# Patient Record
Sex: MALE | Race: ASIAN | NOT HISPANIC OR LATINO | URBAN - METROPOLITAN AREA
[De-identification: names, ages, dates, MRNs, and addresses within clinical notes are randomized per-mention and may not be internally consistent; named-entity substitution may affect disease eponyms.]

---

## 2020-09-16 ENCOUNTER — NEW PATIENT (OUTPATIENT)
Dept: URBAN - METROPOLITAN AREA CLINIC 26 | Facility: CLINIC | Age: 70
End: 2020-09-16

## 2020-09-16 VITALS — BODY MASS INDEX: 22.96 KG/M2 | HEIGHT: 69 IN | WEIGHT: 155 LBS

## 2020-09-16 DIAGNOSIS — H35.3132: ICD-10-CM

## 2020-09-16 DIAGNOSIS — H35.341: ICD-10-CM

## 2020-09-16 DIAGNOSIS — H35.463: ICD-10-CM

## 2020-09-16 DIAGNOSIS — H43.813: ICD-10-CM

## 2020-09-16 PROCEDURE — 92250 FUNDUS PHOTOGRAPHY W/I&R: CPT

## 2020-09-16 PROCEDURE — 99204 OFFICE O/P NEW MOD 45 MIN: CPT

## 2020-09-16 ASSESSMENT — TONOMETRY
OS_IOP_MMHG: 15
OD_IOP_MMHG: 15

## 2020-09-16 ASSESSMENT — VISUAL ACUITY
OS_SC: 20/60
OD_SC: 20/200

## 2020-10-14 ENCOUNTER — PRE-OP VISIT (OUTPATIENT)
Dept: URBAN - METROPOLITAN AREA CLINIC 26 | Facility: CLINIC | Age: 70
End: 2020-10-14

## 2020-10-14 DIAGNOSIS — H43.813: ICD-10-CM

## 2020-10-14 DIAGNOSIS — H35.3132: ICD-10-CM

## 2020-10-14 DIAGNOSIS — H35.463: ICD-10-CM

## 2020-10-14 DIAGNOSIS — H35.341: ICD-10-CM

## 2020-10-14 PROCEDURE — 92250 FUNDUS PHOTOGRAPHY W/I&R: CPT

## 2020-10-14 PROCEDURE — 99214 OFFICE O/P EST MOD 30 MIN: CPT

## 2020-10-14 ASSESSMENT — TONOMETRY
OS_IOP_MMHG: 18
OD_IOP_MMHG: 15

## 2020-10-14 ASSESSMENT — VISUAL ACUITY
OS_SC: 20/50+2
OD_SC: 20/200

## 2020-10-21 ENCOUNTER — SURGERY/PROCEDURE (OUTPATIENT)
Dept: URBAN - METROPOLITAN AREA SURGERY 10 | Facility: SURGERY | Age: 70
End: 2020-10-21

## 2020-10-21 DIAGNOSIS — H35.341: ICD-10-CM

## 2020-10-21 PROCEDURE — 67042 VIT FOR MACULAR HOLE: CPT

## 2020-10-22 ENCOUNTER — POST OP-DILATION (OUTPATIENT)
Dept: URBAN - METROPOLITAN AREA CLINIC 26 | Facility: CLINIC | Age: 70
End: 2020-10-22

## 2020-10-22 DIAGNOSIS — H35.341: ICD-10-CM

## 2020-10-22 DIAGNOSIS — Z98.890: ICD-10-CM

## 2020-10-22 PROCEDURE — 99024 POSTOP FOLLOW-UP VISIT: CPT

## 2020-10-22 RX ORDER — TOBRAMYCIN 3 MG/ML: 1 SOLUTION/ DROPS OPHTHALMIC

## 2020-10-22 RX ORDER — BRIMONIDINE TARTRATE 2 MG/MG: 1 SOLUTION/ DROPS OPHTHALMIC

## 2020-10-22 RX ORDER — PREDNISOLONE ACETATE 10 MG/ML: 1 SUSPENSION/ DROPS OPHTHALMIC

## 2020-10-22 ASSESSMENT — VISUAL ACUITY
OD_SC: CF 1FT
OS_SC: 20/40

## 2020-10-22 ASSESSMENT — TONOMETRY
OS_IOP_MMHG: 19
OD_IOP_MMHG: 21

## 2020-10-23 ENCOUNTER — POST OP-DILATION (OUTPATIENT)
Dept: URBAN - METROPOLITAN AREA CLINIC 26 | Facility: CLINIC | Age: 70
End: 2020-10-23

## 2020-10-23 DIAGNOSIS — Z98.890: ICD-10-CM

## 2020-10-23 DIAGNOSIS — H35.341: ICD-10-CM

## 2020-10-23 PROCEDURE — 99024 POSTOP FOLLOW-UP VISIT: CPT

## 2020-10-23 ASSESSMENT — VISUAL ACUITY
OD_SC: CF 1FT
OS_SC: 20/40-1

## 2020-10-23 ASSESSMENT — TONOMETRY
OS_IOP_MMHG: 14
OD_IOP_MMHG: 13

## 2020-10-28 ENCOUNTER — POST OP-DILATION (OUTPATIENT)
Dept: URBAN - METROPOLITAN AREA CLINIC 26 | Facility: CLINIC | Age: 70
End: 2020-10-28

## 2020-10-28 DIAGNOSIS — Z98.890: ICD-10-CM

## 2020-10-28 DIAGNOSIS — H35.3132: ICD-10-CM

## 2020-10-28 PROCEDURE — 99024 POSTOP FOLLOW-UP VISIT: CPT

## 2020-10-28 ASSESSMENT — TONOMETRY
OS_IOP_MMHG: 12
OD_IOP_MMHG: 10

## 2020-10-28 ASSESSMENT — VISUAL ACUITY
OD_SC: 20/200
OS_PH: 20/40-2
OS_SC: 20/50
OD_PH: 20/100+2

## 2020-11-18 ENCOUNTER — POST-OP CHECK (OUTPATIENT)
Dept: URBAN - METROPOLITAN AREA CLINIC 26 | Facility: CLINIC | Age: 70
End: 2020-11-18

## 2020-11-18 DIAGNOSIS — H35.463: ICD-10-CM

## 2020-11-18 DIAGNOSIS — H35.3132: ICD-10-CM

## 2020-11-18 DIAGNOSIS — H02.833: ICD-10-CM

## 2020-11-18 DIAGNOSIS — H43.812: ICD-10-CM

## 2020-11-18 DIAGNOSIS — Z96.1: ICD-10-CM

## 2020-11-18 DIAGNOSIS — H02.836: ICD-10-CM

## 2020-11-18 DIAGNOSIS — Z98.890: ICD-10-CM

## 2020-11-18 PROCEDURE — 99024 POSTOP FOLLOW-UP VISIT: CPT

## 2020-11-18 PROCEDURE — 92250 FUNDUS PHOTOGRAPHY W/I&R: CPT

## 2020-11-18 ASSESSMENT — VISUAL ACUITY
OD_PH: 20/50+2
OD_SC: 20/80+2
OS_SC: 20/40

## 2020-11-18 ASSESSMENT — TONOMETRY
OD_IOP_MMHG: 14
OS_IOP_MMHG: 16

## 2020-12-21 ENCOUNTER — POST-OP CHECK (OUTPATIENT)
Dept: URBAN - METROPOLITAN AREA CLINIC 26 | Facility: CLINIC | Age: 70
End: 2020-12-21

## 2020-12-21 DIAGNOSIS — H35.463: ICD-10-CM

## 2020-12-21 DIAGNOSIS — Z98.890: ICD-10-CM

## 2020-12-21 DIAGNOSIS — H35.3132: ICD-10-CM

## 2020-12-21 DIAGNOSIS — H43.812: ICD-10-CM

## 2020-12-21 PROCEDURE — 99024 POSTOP FOLLOW-UP VISIT: CPT

## 2020-12-21 PROCEDURE — 92250 FUNDUS PHOTOGRAPHY W/I&R: CPT

## 2020-12-21 ASSESSMENT — VISUAL ACUITY
OS_SC: 20/30-2
OD_SC: 20/80

## 2020-12-21 ASSESSMENT — TONOMETRY
OD_IOP_MMHG: 13
OS_IOP_MMHG: 14

## 2021-01-25 ENCOUNTER — POST-OP CHECK (OUTPATIENT)
Dept: URBAN - METROPOLITAN AREA CLINIC 26 | Facility: CLINIC | Age: 71
End: 2021-01-25

## 2021-01-25 DIAGNOSIS — H35.463: ICD-10-CM

## 2021-01-25 DIAGNOSIS — Z96.1: ICD-10-CM

## 2021-01-25 DIAGNOSIS — H35.3132: ICD-10-CM

## 2021-01-25 DIAGNOSIS — H02.836: ICD-10-CM

## 2021-01-25 DIAGNOSIS — H02.833: ICD-10-CM

## 2021-01-25 DIAGNOSIS — H43.812: ICD-10-CM

## 2021-01-25 DIAGNOSIS — Z98.890: ICD-10-CM

## 2021-01-25 PROCEDURE — 92250 FUNDUS PHOTOGRAPHY W/I&R: CPT

## 2021-01-25 PROCEDURE — 99024 POSTOP FOLLOW-UP VISIT: CPT

## 2021-01-25 ASSESSMENT — TONOMETRY
OS_IOP_MMHG: 12
OD_IOP_MMHG: 13

## 2021-01-25 ASSESSMENT — VISUAL ACUITY
OS_CC: 20/30-2
OD_PH: 20/50
OD_CC: 20/80

## 2021-03-08 ENCOUNTER — FOLLOW UP (OUTPATIENT)
Dept: URBAN - METROPOLITAN AREA CLINIC 26 | Facility: CLINIC | Age: 71
End: 2021-03-08

## 2021-03-08 VITALS
WEIGHT: 154 LBS | SYSTOLIC BLOOD PRESSURE: 133 MMHG | BODY MASS INDEX: 22.81 KG/M2 | DIASTOLIC BLOOD PRESSURE: 90 MMHG | HEIGHT: 69 IN | HEART RATE: 70 BPM

## 2021-03-08 DIAGNOSIS — H02.833: ICD-10-CM

## 2021-03-08 DIAGNOSIS — H43.812: ICD-10-CM

## 2021-03-08 DIAGNOSIS — H02.836: ICD-10-CM

## 2021-03-08 DIAGNOSIS — H53.9: ICD-10-CM

## 2021-03-08 DIAGNOSIS — H35.463: ICD-10-CM

## 2021-03-08 DIAGNOSIS — H35.3132: ICD-10-CM

## 2021-03-08 PROCEDURE — 92250 FUNDUS PHOTOGRAPHY W/I&R: CPT

## 2021-03-08 PROCEDURE — 92235 FLUORESCEIN ANGRPH MLTIFRAME: CPT

## 2021-03-08 PROCEDURE — 92014 COMPRE OPH EXAM EST PT 1/>: CPT

## 2021-03-08 ASSESSMENT — VISUAL ACUITY
OD_PH: 20/50
OD_SC: 20/60+2
OS_SC: 20/30

## 2021-03-08 ASSESSMENT — TONOMETRY
OS_IOP_MMHG: 13
OD_IOP_MMHG: 14

## 2021-07-12 ENCOUNTER — FOLLOW UP (OUTPATIENT)
Dept: URBAN - METROPOLITAN AREA CLINIC 26 | Facility: CLINIC | Age: 71
End: 2021-07-12

## 2021-07-12 DIAGNOSIS — H53.9: ICD-10-CM

## 2021-07-12 DIAGNOSIS — H35.463: ICD-10-CM

## 2021-07-12 DIAGNOSIS — H35.3132: ICD-10-CM

## 2021-07-12 DIAGNOSIS — H43.812: ICD-10-CM

## 2021-07-12 PROCEDURE — 92250 FUNDUS PHOTOGRAPHY W/I&R: CPT

## 2021-07-12 PROCEDURE — 92134 CPTRZ OPH DX IMG PST SGM RTA: CPT

## 2021-07-12 PROCEDURE — 92014 COMPRE OPH EXAM EST PT 1/>: CPT

## 2021-07-12 ASSESSMENT — TONOMETRY
OS_IOP_MMHG: 20
OD_IOP_MMHG: 12

## 2021-07-12 ASSESSMENT — VISUAL ACUITY
OS_PH: 20/40+2
OD_SC: 20/50
OS_SC: 20/50+1

## 2021-10-28 NOTE — PATIENT DISCUSSION
Pt has mild refractive error, but due to pupil dilation from Adderall, he needs RX for near as well. The pt reports large pupil even before the med, but he likely has naturally larger pupils due to light eye color and then with the addition of the Adderall, he has increased symptoms.

## 2021-10-28 NOTE — PATIENT DISCUSSION
Pt has likely naturally large pupils and then has side effect of pupil dilation from Adderall. Therefore he has photophobia and blurred vision. Recommended glasses and tinted lenses.

## 2022-01-17 ENCOUNTER — FOLLOW UP (OUTPATIENT)
Dept: URBAN - METROPOLITAN AREA CLINIC 26 | Facility: CLINIC | Age: 72
End: 2022-01-17

## 2022-01-17 VITALS — HEIGHT: 69 IN | BODY MASS INDEX: 22.66 KG/M2 | WEIGHT: 153 LBS

## 2022-01-17 DIAGNOSIS — H43.812: ICD-10-CM

## 2022-01-17 DIAGNOSIS — H35.3132: ICD-10-CM

## 2022-01-17 DIAGNOSIS — H35.463: ICD-10-CM

## 2022-01-17 DIAGNOSIS — H53.9: ICD-10-CM

## 2022-01-17 PROCEDURE — 92134 CPTRZ OPH DX IMG PST SGM RTA: CPT

## 2022-01-17 PROCEDURE — 92250 FUNDUS PHOTOGRAPHY W/I&R: CPT

## 2022-01-17 PROCEDURE — 92014 COMPRE OPH EXAM EST PT 1/>: CPT

## 2022-01-17 ASSESSMENT — VISUAL ACUITY
OD_SC: 20/50
OS_SC: 20/40-1

## 2022-01-17 ASSESSMENT — TONOMETRY
OD_IOP_MMHG: 12
OS_IOP_MMHG: 11

## 2023-01-09 ENCOUNTER — FOLLOW UP (OUTPATIENT)
Dept: URBAN - METROPOLITAN AREA CLINIC 26 | Facility: CLINIC | Age: 73
End: 2023-01-09

## 2023-01-09 VITALS — HEIGHT: 67 IN | BODY MASS INDEX: 21.66 KG/M2 | WEIGHT: 138 LBS

## 2023-01-09 DIAGNOSIS — H35.3132: ICD-10-CM

## 2023-01-09 DIAGNOSIS — H43.812: ICD-10-CM

## 2023-01-09 DIAGNOSIS — Z98.890: ICD-10-CM

## 2023-01-09 PROCEDURE — 92134 CPTRZ OPH DX IMG PST SGM RTA: CPT

## 2023-01-09 PROCEDURE — 92014 COMPRE OPH EXAM EST PT 1/>: CPT

## 2023-01-09 PROCEDURE — 92250 FUNDUS PHOTOGRAPHY W/I&R: CPT

## 2023-01-09 ASSESSMENT — VISUAL ACUITY
OD_PH: 20/70+2
OD_SC: 20/70+
OS_SC: 20/30+1

## 2023-01-09 ASSESSMENT — TONOMETRY
OS_IOP_MMHG: 14
OD_IOP_MMHG: 15

## 2024-01-09 ENCOUNTER — FOLLOW UP (OUTPATIENT)
Dept: URBAN - METROPOLITAN AREA CLINIC 26 | Facility: CLINIC | Age: 74
End: 2024-01-09

## 2024-01-09 VITALS — HEIGHT: 66 IN | BODY MASS INDEX: 25.07 KG/M2 | WEIGHT: 156 LBS

## 2024-01-09 DIAGNOSIS — H35.463: ICD-10-CM

## 2024-01-09 DIAGNOSIS — H35.372: ICD-10-CM

## 2024-01-09 DIAGNOSIS — H35.3132: ICD-10-CM

## 2024-01-09 DIAGNOSIS — H02.836: ICD-10-CM

## 2024-01-09 DIAGNOSIS — Z98.890: ICD-10-CM

## 2024-01-09 DIAGNOSIS — H43.812: ICD-10-CM

## 2024-01-09 DIAGNOSIS — H02.833: ICD-10-CM

## 2024-01-09 DIAGNOSIS — Z96.1: ICD-10-CM

## 2024-01-09 PROCEDURE — 92250 FUNDUS PHOTOGRAPHY W/I&R: CPT

## 2024-01-09 PROCEDURE — 92134 CPTRZ OPH DX IMG PST SGM RTA: CPT

## 2024-01-09 PROCEDURE — 92014 COMPRE OPH EXAM EST PT 1/>: CPT

## 2024-01-09 ASSESSMENT — VISUAL ACUITY
OD_SC: 20/50
OS_SC: 20/30

## 2024-01-09 ASSESSMENT — TONOMETRY
OD_IOP_MMHG: 14
OS_IOP_MMHG: 13